# Patient Record
Sex: MALE | Race: WHITE | Employment: FULL TIME | URBAN - METROPOLITAN AREA
[De-identification: names, ages, dates, MRNs, and addresses within clinical notes are randomized per-mention and may not be internally consistent; named-entity substitution may affect disease eponyms.]

---

## 2017-02-24 ENCOUNTER — OFFICE VISIT (OUTPATIENT)
Dept: INTERNAL MEDICINE CLINIC | Age: 62
End: 2017-02-24

## 2017-02-24 VITALS
SYSTOLIC BLOOD PRESSURE: 128 MMHG | DIASTOLIC BLOOD PRESSURE: 80 MMHG | WEIGHT: 277 LBS | HEIGHT: 76 IN | BODY MASS INDEX: 33.73 KG/M2 | HEART RATE: 72 BPM

## 2017-02-24 DIAGNOSIS — N52.8 OTHER MALE ERECTILE DYSFUNCTION: ICD-10-CM

## 2017-02-24 DIAGNOSIS — K21.9 GASTROESOPHAGEAL REFLUX DISEASE WITHOUT ESOPHAGITIS: Primary | ICD-10-CM

## 2017-02-24 DIAGNOSIS — G47.00 INSOMNIA, UNSPECIFIED TYPE: ICD-10-CM

## 2017-02-24 PROCEDURE — 99213 OFFICE O/P EST LOW 20 MIN: CPT | Performed by: INTERNAL MEDICINE

## 2017-02-24 RX ORDER — ZOLPIDEM TARTRATE 10 MG/1
TABLET ORAL
Qty: 90 TABLET | Refills: 0 | Status: SHIPPED | OUTPATIENT
Start: 2017-02-24 | End: 2017-06-05 | Stop reason: SDUPTHER

## 2017-02-24 RX ORDER — OMEPRAZOLE 20 MG/1
CAPSULE, DELAYED RELEASE ORAL
Qty: 90 CAPSULE | Refills: 1 | Status: SHIPPED | OUTPATIENT
Start: 2017-02-24 | End: 2017-08-25 | Stop reason: SDUPTHER

## 2017-02-24 RX ORDER — TADALAFIL 5 MG/1
TABLET ORAL
Qty: 30 TABLET | Refills: 5 | Status: SHIPPED | OUTPATIENT
Start: 2017-02-24 | End: 2017-07-07 | Stop reason: SDUPTHER

## 2017-02-24 ASSESSMENT — ENCOUNTER SYMPTOMS
BLOOD IN STOOL: 0
COUGH: 0
WHEEZING: 0
SHORTNESS OF BREATH: 0
NAUSEA: 0
VOMITING: 0
DIARRHEA: 0
CHEST TIGHTNESS: 0
ABDOMINAL PAIN: 0

## 2017-05-30 RX ORDER — SILDENAFIL CITRATE 20 MG/1
TABLET ORAL
Qty: 30 TABLET | Refills: 2 | Status: SHIPPED | OUTPATIENT
Start: 2017-05-30 | End: 2017-07-07 | Stop reason: SDUPTHER

## 2017-06-05 RX ORDER — ZOLPIDEM TARTRATE 10 MG/1
TABLET ORAL
Qty: 90 TABLET | Refills: 0 | Status: SHIPPED | OUTPATIENT
Start: 2017-06-05 | End: 2017-09-05 | Stop reason: SDUPTHER

## 2017-07-07 RX ORDER — TADALAFIL 5 MG/1
TABLET ORAL
Qty: 30 TABLET | Refills: 2 | Status: SHIPPED | OUTPATIENT
Start: 2017-07-07 | End: 2017-07-13 | Stop reason: ALTCHOICE

## 2017-07-07 RX ORDER — SILDENAFIL CITRATE 20 MG/1
TABLET ORAL
Qty: 30 TABLET | Refills: 2 | Status: SHIPPED | OUTPATIENT
Start: 2017-07-07 | End: 2017-07-07 | Stop reason: ALTCHOICE

## 2017-07-13 ENCOUNTER — OFFICE VISIT (OUTPATIENT)
Dept: INTERNAL MEDICINE CLINIC | Age: 62
End: 2017-07-13

## 2017-07-13 VITALS
SYSTOLIC BLOOD PRESSURE: 142 MMHG | BODY MASS INDEX: 34.46 KG/M2 | DIASTOLIC BLOOD PRESSURE: 85 MMHG | HEART RATE: 60 BPM | HEIGHT: 76 IN | WEIGHT: 283 LBS

## 2017-07-13 DIAGNOSIS — M79.674 GREAT TOE PAIN, RIGHT: ICD-10-CM

## 2017-07-13 DIAGNOSIS — M79.674 GREAT TOE PAIN, RIGHT: Primary | ICD-10-CM

## 2017-07-13 PROCEDURE — 99213 OFFICE O/P EST LOW 20 MIN: CPT | Performed by: PHYSICIAN ASSISTANT

## 2017-07-13 RX ORDER — ONDANSETRON 4 MG/1
TABLET, FILM COATED ORAL
Qty: 20 TABLET | Refills: 0 | Status: SHIPPED | OUTPATIENT
Start: 2017-07-13 | End: 2018-02-26 | Stop reason: SDUPTHER

## 2017-07-13 RX ORDER — SILDENAFIL CITRATE 20 MG/1
TABLET ORAL
Qty: 30 TABLET | Refills: 2 | Status: SHIPPED | OUTPATIENT
Start: 2017-07-13 | End: 2018-02-26 | Stop reason: SDUPTHER

## 2017-07-13 ASSESSMENT — ENCOUNTER SYMPTOMS
VOMITING: 0
COLOR CHANGE: 1
NAUSEA: 0

## 2017-07-14 LAB — URIC ACID, SERUM: 7.2 MG/DL (ref 3.5–7.2)

## 2017-07-19 ENCOUNTER — OFFICE VISIT (OUTPATIENT)
Dept: INTERNAL MEDICINE CLINIC | Age: 62
End: 2017-07-19

## 2017-07-19 ENCOUNTER — HOSPITAL ENCOUNTER (OUTPATIENT)
Dept: OTHER | Age: 62
Discharge: HOME | End: 2017-07-19
Attending: PHYSICIAN ASSISTANT | Admitting: PHYSICIAN ASSISTANT

## 2017-07-19 VITALS — WEIGHT: 283 LBS | HEIGHT: 76 IN | BODY MASS INDEX: 34.46 KG/M2

## 2017-07-19 DIAGNOSIS — M79.674 GREAT TOE PAIN, RIGHT: Primary | ICD-10-CM

## 2017-07-19 DIAGNOSIS — R13.14 PHARYNGOESOPHAGEAL DYSPHAGIA: ICD-10-CM

## 2017-07-19 DIAGNOSIS — M79.674 GREAT TOE PAIN, RIGHT: ICD-10-CM

## 2017-07-19 PROCEDURE — 99212 OFFICE O/P EST SF 10 MIN: CPT | Performed by: PHYSICIAN ASSISTANT

## 2017-07-19 ASSESSMENT — ENCOUNTER SYMPTOMS
COLOR CHANGE: 1
VOMITING: 0
NAUSEA: 0

## 2017-07-27 ENCOUNTER — HOSPITAL ENCOUNTER (OUTPATIENT)
Dept: OTHER | Age: 62
Discharge: HOME | End: 2017-07-27
Attending: INTERNAL MEDICINE | Admitting: INTERNAL MEDICINE

## 2017-07-27 VITALS
RESPIRATION RATE: 16 BRPM | BODY MASS INDEX: 34.1 KG/M2 | DIASTOLIC BLOOD PRESSURE: 78 MMHG | SYSTOLIC BLOOD PRESSURE: 135 MMHG | OXYGEN SATURATION: 98 % | HEIGHT: 76 IN | HEART RATE: 60 BPM | TEMPERATURE: 97.5 F | WEIGHT: 280 LBS

## 2017-07-27 RX ORDER — SODIUM CHLORIDE, SODIUM LACTATE, POTASSIUM CHLORIDE, CALCIUM CHLORIDE 600; 310; 30; 20 MG/100ML; MG/100ML; MG/100ML; MG/100ML
INJECTION, SOLUTION INTRAVENOUS CONTINUOUS
Status: DISCONTINUED | OUTPATIENT
Start: 2017-07-27 | End: 2017-07-28 | Stop reason: HOSPADM

## 2017-07-27 RX ADMIN — SODIUM CHLORIDE, SODIUM LACTATE, POTASSIUM CHLORIDE, CALCIUM CHLORIDE: 600; 310; 30; 20 INJECTION, SOLUTION INTRAVENOUS at 08:35

## 2017-07-27 ASSESSMENT — PAIN - FUNCTIONAL ASSESSMENT: PAIN_FUNCTIONAL_ASSESSMENT: 0-10

## 2017-08-25 ENCOUNTER — OFFICE VISIT (OUTPATIENT)
Dept: INTERNAL MEDICINE CLINIC | Age: 62
End: 2017-08-25

## 2017-08-25 VITALS
HEIGHT: 76 IN | HEART RATE: 72 BPM | BODY MASS INDEX: 33.61 KG/M2 | SYSTOLIC BLOOD PRESSURE: 130 MMHG | WEIGHT: 276 LBS | DIASTOLIC BLOOD PRESSURE: 80 MMHG

## 2017-08-25 DIAGNOSIS — R11.0 NAUSEA: ICD-10-CM

## 2017-08-25 DIAGNOSIS — K21.9 GASTROESOPHAGEAL REFLUX DISEASE WITHOUT ESOPHAGITIS: Primary | ICD-10-CM

## 2017-08-25 DIAGNOSIS — M15.9 PRIMARY OSTEOARTHRITIS INVOLVING MULTIPLE JOINTS: ICD-10-CM

## 2017-08-25 DIAGNOSIS — G47.00 INSOMNIA, UNSPECIFIED TYPE: ICD-10-CM

## 2017-08-25 PROCEDURE — 99214 OFFICE O/P EST MOD 30 MIN: CPT | Performed by: INTERNAL MEDICINE

## 2017-08-25 RX ORDER — OMEPRAZOLE 20 MG/1
CAPSULE, DELAYED RELEASE ORAL
Qty: 180 CAPSULE | Refills: 0 | Status: SHIPPED | OUTPATIENT
Start: 2017-08-25 | End: 2018-02-26 | Stop reason: SDUPTHER

## 2017-08-25 RX ORDER — TRAMADOL HYDROCHLORIDE 50 MG/1
50 TABLET ORAL EVERY 12 HOURS PRN
Qty: 60 TABLET | Refills: 1 | Status: SHIPPED | OUTPATIENT
Start: 2017-08-25 | End: 2017-12-20 | Stop reason: SDUPTHER

## 2017-08-25 ASSESSMENT — ENCOUNTER SYMPTOMS
DIARRHEA: 0
COUGH: 0
SHORTNESS OF BREATH: 0
BLOOD IN STOOL: 0
CHEST TIGHTNESS: 0
ABDOMINAL PAIN: 0
NAUSEA: 1
VOMITING: 1
WHEEZING: 0

## 2017-09-06 RX ORDER — ZOLPIDEM TARTRATE 10 MG/1
TABLET ORAL
Qty: 90 TABLET | Refills: 0 | Status: SHIPPED | OUTPATIENT
Start: 2017-09-06 | End: 2017-12-18 | Stop reason: SDUPTHER

## 2017-12-20 RX ORDER — ZOLPIDEM TARTRATE 10 MG/1
TABLET ORAL
Qty: 90 TABLET | Refills: 0 | Status: SHIPPED | OUTPATIENT
Start: 2017-12-20 | End: 2018-03-29 | Stop reason: SDUPTHER

## 2017-12-20 RX ORDER — TRAMADOL HYDROCHLORIDE 50 MG/1
TABLET ORAL
Qty: 60 TABLET | Refills: 1 | Status: SHIPPED | OUTPATIENT
Start: 2017-12-20 | End: 2021-12-25

## 2018-02-26 ENCOUNTER — OFFICE VISIT (OUTPATIENT)
Dept: INTERNAL MEDICINE CLINIC | Age: 63
End: 2018-02-26

## 2018-02-26 VITALS
BODY MASS INDEX: 34.58 KG/M2 | DIASTOLIC BLOOD PRESSURE: 80 MMHG | HEART RATE: 72 BPM | HEIGHT: 76 IN | WEIGHT: 284 LBS | SYSTOLIC BLOOD PRESSURE: 128 MMHG

## 2018-02-26 DIAGNOSIS — K21.9 GASTROESOPHAGEAL REFLUX DISEASE WITHOUT ESOPHAGITIS: Primary | ICD-10-CM

## 2018-02-26 DIAGNOSIS — R11.2 NON-INTRACTABLE VOMITING WITH NAUSEA, UNSPECIFIED VOMITING TYPE: ICD-10-CM

## 2018-02-26 DIAGNOSIS — N52.8 OTHER MALE ERECTILE DYSFUNCTION: ICD-10-CM

## 2018-02-26 DIAGNOSIS — M15.9 PRIMARY OSTEOARTHRITIS INVOLVING MULTIPLE JOINTS: ICD-10-CM

## 2018-02-26 DIAGNOSIS — G47.00 INSOMNIA, UNSPECIFIED TYPE: ICD-10-CM

## 2018-02-26 LAB
BASOPHILS ABSOLUTE: 0 K/UL (ref 0–0.2)
BASOPHILS RELATIVE PERCENT: 0.4 %
EOSINOPHILS ABSOLUTE: 0.1 K/UL (ref 0–0.6)
EOSINOPHILS RELATIVE PERCENT: 1.6 %
HCT VFR BLD CALC: 43.1 % (ref 40.5–52.5)
HEMOGLOBIN: 14.8 G/DL (ref 13.5–17.5)
LYMPHOCYTES ABSOLUTE: 1.8 K/UL (ref 1–5.1)
LYMPHOCYTES RELATIVE PERCENT: 37 %
MCH RBC QN AUTO: 30.7 PG (ref 26–34)
MCHC RBC AUTO-ENTMCNC: 34.3 G/DL (ref 31–36)
MCV RBC AUTO: 89.5 FL (ref 80–100)
MONOCYTES ABSOLUTE: 0.3 K/UL (ref 0–1.3)
MONOCYTES RELATIVE PERCENT: 6 %
NEUTROPHILS ABSOLUTE: 2.6 K/UL (ref 1.7–7.7)
NEUTROPHILS RELATIVE PERCENT: 55 %
PDW BLD-RTO: 12.9 % (ref 12.4–15.4)
PLATELET # BLD: 180 K/UL (ref 135–450)
PMV BLD AUTO: 9.5 FL (ref 5–10.5)
RBC # BLD: 4.81 M/UL (ref 4.2–5.9)
WBC # BLD: 4.7 K/UL (ref 4–11)

## 2018-02-26 PROCEDURE — 99214 OFFICE O/P EST MOD 30 MIN: CPT | Performed by: INTERNAL MEDICINE

## 2018-02-26 RX ORDER — SILDENAFIL CITRATE 20 MG/1
TABLET ORAL
Qty: 30 TABLET | Refills: 2 | Status: SHIPPED | OUTPATIENT
Start: 2018-02-26 | End: 2021-12-25

## 2018-02-26 RX ORDER — OMEPRAZOLE 20 MG/1
CAPSULE, DELAYED RELEASE ORAL
Qty: 180 CAPSULE | Refills: 0 | Status: SHIPPED | OUTPATIENT
Start: 2018-02-26

## 2018-02-26 RX ORDER — ONDANSETRON 4 MG/1
TABLET, FILM COATED ORAL
Qty: 20 TABLET | Refills: 0 | Status: SHIPPED | OUTPATIENT
Start: 2018-02-26 | End: 2021-12-25

## 2018-02-26 ASSESSMENT — ENCOUNTER SYMPTOMS
WHEEZING: 0
BLOOD IN STOOL: 0
SHORTNESS OF BREATH: 0
ABDOMINAL PAIN: 0
NAUSEA: 0
DIARRHEA: 0
COUGH: 0
CHEST TIGHTNESS: 0
VOMITING: 1

## 2018-02-26 NOTE — PROGRESS NOTES
Subjective:      Patient ID: Nevaeh Anne is a 63 y.o. male. HPI  Is here for follow up of PUD,HTN,diet controlled DM,insomnia,GERD and erectile dysfunction. BP and sugars have been good ever since his weight loss from gastric bypass surgery. Insomnia is stable. GERD. He is currently on PPI. GERD symptoms are worse at night when he lays in bed. Continues to have nausea periodically. Has reflux symptoms. Saw GI.  EGD- mild anastamotic stricture. Dilated. He claims to be vomiting on a regular basis. No weight loss    Review of Systems   Constitutional: Negative. Negative for fatigue and fever. Respiratory: Negative for cough, chest tightness, shortness of breath and wheezing. Cardiovascular: Negative for chest pain and palpitations. Gastrointestinal: Positive for vomiting. Negative for abdominal pain, blood in stool, diarrhea and nausea. Genitourinary: Negative for dysuria and hematuria. Objective:   Physical Exam   Constitutional: He is oriented to person, place, and time. He appears well-developed and well-nourished. HENT:   Head: Normocephalic and atraumatic. Eyes: Pupils are equal, round, and reactive to light. Neck: Normal range of motion. Neck supple. No thyromegaly present. Cardiovascular: Normal rate, regular rhythm and normal heart sounds. Exam reveals no gallop and no friction rub. No murmur heard. No carotid bruit   Pulmonary/Chest: Effort normal and breath sounds normal. No respiratory distress. He has no wheezes. He has no rales. He exhibits no tenderness. Abdominal: Soft. Bowel sounds are normal. He exhibits no distension and no mass. There is no tenderness. There is no rebound and no guarding. Musculoskeletal: He exhibits no edema. Neurological: He is alert and oriented to person, place, and time. Assessment:      1. Gastroesophageal reflux disease without esophagitis     2. Insomnia, unspecified type     3. Other male erectile dysfunction     4.

## 2018-02-27 LAB
A/G RATIO: 1.9 (ref 1.1–2.2)
ALBUMIN SERPL-MCNC: 4.4 G/DL (ref 3.4–5)
ALP BLD-CCNC: 101 U/L (ref 40–129)
ALT SERPL-CCNC: 9 U/L (ref 10–40)
ANION GAP SERPL CALCULATED.3IONS-SCNC: 13 MMOL/L (ref 3–16)
AST SERPL-CCNC: 15 U/L (ref 15–37)
BILIRUB SERPL-MCNC: 0.7 MG/DL (ref 0–1)
BUN BLDV-MCNC: 10 MG/DL (ref 7–20)
CALCIUM SERPL-MCNC: 8.5 MG/DL (ref 8.3–10.6)
CHLORIDE BLD-SCNC: 105 MMOL/L (ref 99–110)
CO2: 24 MMOL/L (ref 21–32)
CREAT SERPL-MCNC: 1 MG/DL (ref 0.8–1.3)
GFR AFRICAN AMERICAN: >60
GFR NON-AFRICAN AMERICAN: >60
GLOBULIN: 2.3 G/DL
GLUCOSE BLD-MCNC: 112 MG/DL (ref 70–99)
POTASSIUM SERPL-SCNC: 4.4 MMOL/L (ref 3.5–5.1)
SODIUM BLD-SCNC: 142 MMOL/L (ref 136–145)
TOTAL PROTEIN: 6.7 G/DL (ref 6.4–8.2)

## 2018-03-29 DIAGNOSIS — G47.00 INSOMNIA, UNSPECIFIED TYPE: Primary | ICD-10-CM

## 2018-03-29 RX ORDER — ZOLPIDEM TARTRATE 10 MG/1
TABLET ORAL
Qty: 90 TABLET | Refills: 0 | Status: SHIPPED | OUTPATIENT
Start: 2018-03-29 | End: 2021-12-25

## 2021-12-25 ENCOUNTER — HOSPITAL ENCOUNTER (EMERGENCY)
Age: 66
Discharge: ANOTHER HOSPITAL | End: 2021-12-26
Attending: EMERGENCY MEDICINE
Payer: OTHER GOVERNMENT

## 2021-12-25 ENCOUNTER — APPOINTMENT (OUTPATIENT)
Dept: GENERAL RADIOLOGY | Age: 66
End: 2021-12-25
Payer: OTHER GOVERNMENT

## 2021-12-25 DIAGNOSIS — R00.1 SYMPTOMATIC BRADYCARDIA: Primary | ICD-10-CM

## 2021-12-25 LAB
A/G RATIO: 1.8 (ref 1.1–2.2)
ALBUMIN SERPL-MCNC: 4.1 G/DL (ref 3.4–5)
ALP BLD-CCNC: 122 U/L (ref 40–129)
ALT SERPL-CCNC: 33 U/L (ref 10–40)
ANION GAP SERPL CALCULATED.3IONS-SCNC: 9 MMOL/L (ref 3–16)
AST SERPL-CCNC: 25 U/L (ref 15–37)
BASOPHILS ABSOLUTE: 0 K/UL (ref 0–0.2)
BASOPHILS RELATIVE PERCENT: 0.7 %
BILIRUB SERPL-MCNC: 0.4 MG/DL (ref 0–1)
BUN BLDV-MCNC: 12 MG/DL (ref 7–20)
CALCIUM SERPL-MCNC: 8.5 MG/DL (ref 8.3–10.6)
CHLORIDE BLD-SCNC: 101 MMOL/L (ref 99–110)
CO2: 24 MMOL/L (ref 21–32)
CREAT SERPL-MCNC: 1.1 MG/DL (ref 0.8–1.3)
EOSINOPHILS ABSOLUTE: 0.2 K/UL (ref 0–0.6)
EOSINOPHILS RELATIVE PERCENT: 4 %
GFR AFRICAN AMERICAN: >60
GFR NON-AFRICAN AMERICAN: >60
GLUCOSE BLD-MCNC: 115 MG/DL (ref 70–99)
HCT VFR BLD CALC: 41.4 % (ref 40.5–52.5)
HEMOGLOBIN: 14.4 G/DL (ref 13.5–17.5)
LYMPHOCYTES ABSOLUTE: 1.7 K/UL (ref 1–5.1)
LYMPHOCYTES RELATIVE PERCENT: 35.1 %
MCH RBC QN AUTO: 32.1 PG (ref 26–34)
MCHC RBC AUTO-ENTMCNC: 34.8 G/DL (ref 31–36)
MCV RBC AUTO: 92.3 FL (ref 80–100)
MONOCYTES ABSOLUTE: 0.3 K/UL (ref 0–1.3)
MONOCYTES RELATIVE PERCENT: 6.5 %
NEUTROPHILS ABSOLUTE: 2.6 K/UL (ref 1.7–7.7)
NEUTROPHILS RELATIVE PERCENT: 53.7 %
PDW BLD-RTO: 12.9 % (ref 12.4–15.4)
PLATELET # BLD: 160 K/UL (ref 135–450)
PMV BLD AUTO: 7.3 FL (ref 5–10.5)
POTASSIUM REFLEX MAGNESIUM: 4.3 MMOL/L (ref 3.5–5.1)
PRO-BNP: 243 PG/ML (ref 0–124)
RBC # BLD: 4.48 M/UL (ref 4.2–5.9)
SODIUM BLD-SCNC: 134 MMOL/L (ref 136–145)
TOTAL PROTEIN: 6.4 G/DL (ref 6.4–8.2)
TROPONIN: <0.01 NG/ML
TSH REFLEX: 1.14 UIU/ML (ref 0.27–4.2)
WBC # BLD: 4.8 K/UL (ref 4–11)

## 2021-12-25 PROCEDURE — 85025 COMPLETE CBC W/AUTO DIFF WBC: CPT

## 2021-12-25 PROCEDURE — 84484 ASSAY OF TROPONIN QUANT: CPT

## 2021-12-25 PROCEDURE — 80053 COMPREHEN METABOLIC PANEL: CPT

## 2021-12-25 PROCEDURE — 71045 X-RAY EXAM CHEST 1 VIEW: CPT

## 2021-12-25 PROCEDURE — 99283 EMERGENCY DEPT VISIT LOW MDM: CPT

## 2021-12-25 PROCEDURE — 84443 ASSAY THYROID STIM HORMONE: CPT

## 2021-12-25 PROCEDURE — 93005 ELECTROCARDIOGRAM TRACING: CPT | Performed by: EMERGENCY MEDICINE

## 2021-12-25 PROCEDURE — 83880 ASSAY OF NATRIURETIC PEPTIDE: CPT

## 2021-12-25 RX ORDER — MELOXICAM 15 MG/1
15 TABLET ORAL DAILY
COMMUNITY

## 2021-12-25 RX ORDER — ASPIRIN 81 MG/1
81 TABLET ORAL DAILY
COMMUNITY

## 2021-12-25 RX ORDER — LANOLIN ALCOHOL/MO/W.PET/CERES
1000 CREAM (GRAM) TOPICAL DAILY
COMMUNITY

## 2021-12-25 RX ORDER — PRIMIDONE 250 MG/1
300 TABLET ORAL NIGHTLY
COMMUNITY

## 2021-12-25 RX ORDER — AMITRIPTYLINE HYDROCHLORIDE 10 MG/1
20 TABLET, FILM COATED ORAL NIGHTLY
COMMUNITY

## 2021-12-25 RX ORDER — OXCARBAZEPINE 600 MG/1
600 TABLET, FILM COATED ORAL 2 TIMES DAILY
COMMUNITY

## 2021-12-25 RX ORDER — TAMSULOSIN HYDROCHLORIDE 0.4 MG/1
0.4 CAPSULE ORAL DAILY
COMMUNITY

## 2021-12-25 RX ORDER — METOPROLOL SUCCINATE 100 MG/1
100 TABLET, EXTENDED RELEASE ORAL DAILY
Status: ON HOLD | COMMUNITY
End: 2021-12-26

## 2021-12-25 RX ORDER — BUPROPION HYDROCHLORIDE 150 MG/1
150 TABLET, EXTENDED RELEASE ORAL 2 TIMES DAILY
COMMUNITY

## 2021-12-25 NOTE — ED NOTES
Bed: 06  Expected date:   Expected time:   Means of arrival:   Comments:  CHRISTEL Baez  12/25/21 4832

## 2021-12-26 ENCOUNTER — HOSPITAL ENCOUNTER (INPATIENT)
Age: 66
LOS: 1 days | Discharge: HOME | DRG: 310 | End: 2021-12-27
Attending: INTERNAL MEDICINE | Admitting: INTERNAL MEDICINE
Payer: OTHER GOVERNMENT

## 2021-12-26 VITALS
TEMPERATURE: 97.4 F | HEIGHT: 76 IN | OXYGEN SATURATION: 96 % | RESPIRATION RATE: 16 BRPM | BODY MASS INDEX: 36.53 KG/M2 | HEART RATE: 40 BPM | WEIGHT: 300 LBS | DIASTOLIC BLOOD PRESSURE: 86 MMHG | SYSTOLIC BLOOD PRESSURE: 129 MMHG

## 2021-12-26 DIAGNOSIS — R00.1 SYMPTOMATIC BRADYCARDIA: Primary | ICD-10-CM

## 2021-12-26 LAB
ALBUMIN SERPL-MCNC: 4.3 G/DL (ref 3.4–5)
ANION GAP SERPL CALCULATED.3IONS-SCNC: 10 MMOL/L (ref 3–16)
APTT: 33.6 SEC (ref 26.2–38.6)
BASOPHILS ABSOLUTE: 0 K/UL (ref 0–0.2)
BASOPHILS RELATIVE PERCENT: 0.4 %
BUN BLDV-MCNC: 10 MG/DL (ref 7–20)
CALCIUM SERPL-MCNC: 8.4 MG/DL (ref 8.3–10.6)
CHLORIDE BLD-SCNC: 103 MMOL/L (ref 99–110)
CO2: 24 MMOL/L (ref 21–32)
CREAT SERPL-MCNC: 0.9 MG/DL (ref 0.8–1.3)
EKG ATRIAL RATE: 43 BPM
EKG DIAGNOSIS: NORMAL
EKG P AXIS: 26 DEGREES
EKG P-R INTERVAL: 170 MS
EKG Q-T INTERVAL: 438 MS
EKG QRS DURATION: 92 MS
EKG QTC CALCULATION (BAZETT): 370 MS
EKG R AXIS: 58 DEGREES
EKG T AXIS: 27 DEGREES
EKG VENTRICULAR RATE: 43 BPM
EOSINOPHILS ABSOLUTE: 0.1 K/UL (ref 0–0.6)
EOSINOPHILS RELATIVE PERCENT: 3 %
GFR AFRICAN AMERICAN: >60
GFR NON-AFRICAN AMERICAN: >60
GLUCOSE BLD-MCNC: 113 MG/DL (ref 70–99)
HCT VFR BLD CALC: 44.2 % (ref 40.5–52.5)
HEMOGLOBIN: 15.2 G/DL (ref 13.5–17.5)
INR BLD: 1.07 (ref 0.88–1.12)
LYMPHOCYTES ABSOLUTE: 1.4 K/UL (ref 1–5.1)
LYMPHOCYTES RELATIVE PERCENT: 28.5 %
MAGNESIUM: 2 MG/DL (ref 1.8–2.4)
MCH RBC QN AUTO: 32.4 PG (ref 26–34)
MCHC RBC AUTO-ENTMCNC: 34.5 G/DL (ref 31–36)
MCV RBC AUTO: 94.1 FL (ref 80–100)
MONOCYTES ABSOLUTE: 0.3 K/UL (ref 0–1.3)
MONOCYTES RELATIVE PERCENT: 5.7 %
NEUTROPHILS ABSOLUTE: 3.1 K/UL (ref 1.7–7.7)
NEUTROPHILS RELATIVE PERCENT: 62.4 %
PDW BLD-RTO: 12.9 % (ref 12.4–15.4)
PHOSPHORUS: 3.2 MG/DL (ref 2.5–4.9)
PLATELET # BLD: 148 K/UL (ref 135–450)
PMV BLD AUTO: 7.9 FL (ref 5–10.5)
POTASSIUM SERPL-SCNC: 4.2 MMOL/L (ref 3.5–5.1)
PROTHROMBIN TIME: 12.1 SEC (ref 9.9–12.7)
RBC # BLD: 4.7 M/UL (ref 4.2–5.9)
SODIUM BLD-SCNC: 137 MMOL/L (ref 136–145)
WBC # BLD: 4.9 K/UL (ref 4–11)

## 2021-12-26 PROCEDURE — 93010 ELECTROCARDIOGRAM REPORT: CPT | Performed by: INTERNAL MEDICINE

## 2021-12-26 PROCEDURE — 85025 COMPLETE CBC W/AUTO DIFF WBC: CPT

## 2021-12-26 PROCEDURE — 85730 THROMBOPLASTIN TIME PARTIAL: CPT

## 2021-12-26 PROCEDURE — 80069 RENAL FUNCTION PANEL: CPT

## 2021-12-26 PROCEDURE — 85610 PROTHROMBIN TIME: CPT

## 2021-12-26 PROCEDURE — 6360000002 HC RX W HCPCS: Performed by: STUDENT IN AN ORGANIZED HEALTH CARE EDUCATION/TRAINING PROGRAM

## 2021-12-26 PROCEDURE — 83735 ASSAY OF MAGNESIUM: CPT

## 2021-12-26 PROCEDURE — 2580000003 HC RX 258: Performed by: STUDENT IN AN ORGANIZED HEALTH CARE EDUCATION/TRAINING PROGRAM

## 2021-12-26 PROCEDURE — 6370000000 HC RX 637 (ALT 250 FOR IP): Performed by: STUDENT IN AN ORGANIZED HEALTH CARE EDUCATION/TRAINING PROGRAM

## 2021-12-26 PROCEDURE — 36415 COLL VENOUS BLD VENIPUNCTURE: CPT

## 2021-12-26 PROCEDURE — 2060000000 HC ICU INTERMEDIATE R&B

## 2021-12-26 RX ORDER — HEPARIN SODIUM 5000 [USP'U]/ML
5000 INJECTION, SOLUTION INTRAVENOUS; SUBCUTANEOUS EVERY 8 HOURS SCHEDULED
Status: DISCONTINUED | OUTPATIENT
Start: 2021-12-26 | End: 2021-12-27 | Stop reason: HOSPADM

## 2021-12-26 RX ORDER — ONDANSETRON 4 MG/1
4 TABLET, ORALLY DISINTEGRATING ORAL EVERY 8 HOURS PRN
Status: DISCONTINUED | OUTPATIENT
Start: 2021-12-26 | End: 2021-12-27 | Stop reason: HOSPADM

## 2021-12-26 RX ORDER — SODIUM CHLORIDE 9 MG/ML
25 INJECTION, SOLUTION INTRAVENOUS PRN
Status: DISCONTINUED | OUTPATIENT
Start: 2021-12-26 | End: 2021-12-27 | Stop reason: HOSPADM

## 2021-12-26 RX ORDER — OXCARBAZEPINE 300 MG/1
600 TABLET, FILM COATED ORAL 2 TIMES DAILY
Status: DISCONTINUED | OUTPATIENT
Start: 2021-12-26 | End: 2021-12-27 | Stop reason: HOSPADM

## 2021-12-26 RX ORDER — ASPIRIN 81 MG/1
81 TABLET ORAL DAILY
Status: DISCONTINUED | OUTPATIENT
Start: 2021-12-27 | End: 2021-12-27 | Stop reason: HOSPADM

## 2021-12-26 RX ORDER — SODIUM CHLORIDE 0.9 % (FLUSH) 0.9 %
5-40 SYRINGE (ML) INJECTION PRN
Status: DISCONTINUED | OUTPATIENT
Start: 2021-12-26 | End: 2021-12-27 | Stop reason: HOSPADM

## 2021-12-26 RX ORDER — ACETAMINOPHEN 650 MG/1
650 SUPPOSITORY RECTAL EVERY 6 HOURS PRN
Status: DISCONTINUED | OUTPATIENT
Start: 2021-12-26 | End: 2021-12-27 | Stop reason: HOSPADM

## 2021-12-26 RX ORDER — AMITRIPTYLINE HYDROCHLORIDE 10 MG/1
20 TABLET, FILM COATED ORAL NIGHTLY
Status: DISCONTINUED | OUTPATIENT
Start: 2021-12-26 | End: 2021-12-27 | Stop reason: HOSPADM

## 2021-12-26 RX ORDER — PANTOPRAZOLE SODIUM 40 MG/1
40 TABLET, DELAYED RELEASE ORAL DAILY PRN
Status: DISCONTINUED | OUTPATIENT
Start: 2021-12-26 | End: 2021-12-27 | Stop reason: HOSPADM

## 2021-12-26 RX ORDER — SODIUM CHLORIDE 0.9 % (FLUSH) 0.9 %
5-40 SYRINGE (ML) INJECTION EVERY 12 HOURS SCHEDULED
Status: DISCONTINUED | OUTPATIENT
Start: 2021-12-26 | End: 2021-12-27 | Stop reason: HOSPADM

## 2021-12-26 RX ORDER — ACETAMINOPHEN 325 MG/1
650 TABLET ORAL EVERY 6 HOURS PRN
Status: DISCONTINUED | OUTPATIENT
Start: 2021-12-26 | End: 2021-12-27 | Stop reason: HOSPADM

## 2021-12-26 RX ORDER — BUPROPION HYDROCHLORIDE 150 MG/1
150 TABLET, EXTENDED RELEASE ORAL 2 TIMES DAILY
Status: DISCONTINUED | OUTPATIENT
Start: 2021-12-26 | End: 2021-12-27 | Stop reason: HOSPADM

## 2021-12-26 RX ORDER — LANOLIN ALCOHOL/MO/W.PET/CERES
1000 CREAM (GRAM) TOPICAL DAILY
Status: DISCONTINUED | OUTPATIENT
Start: 2021-12-26 | End: 2021-12-27 | Stop reason: HOSPADM

## 2021-12-26 RX ORDER — ONDANSETRON 2 MG/ML
4 INJECTION INTRAMUSCULAR; INTRAVENOUS EVERY 6 HOURS PRN
Status: DISCONTINUED | OUTPATIENT
Start: 2021-12-26 | End: 2021-12-27 | Stop reason: HOSPADM

## 2021-12-26 RX ORDER — POLYETHYLENE GLYCOL 3350 17 G/17G
17 POWDER, FOR SOLUTION ORAL DAILY PRN
Status: DISCONTINUED | OUTPATIENT
Start: 2021-12-26 | End: 2021-12-27 | Stop reason: HOSPADM

## 2021-12-26 RX ADMIN — OXCARBAZEPINE 600 MG: 300 TABLET, FILM COATED ORAL at 14:03

## 2021-12-26 RX ADMIN — PRIMIDONE 300 MG: 50 TABLET ORAL at 21:35

## 2021-12-26 RX ADMIN — BUPROPION HYDROCHLORIDE 150 MG: 150 TABLET, EXTENDED RELEASE ORAL at 13:59

## 2021-12-26 RX ADMIN — HEPARIN SODIUM 5000 UNITS: 5000 INJECTION INTRAVENOUS; SUBCUTANEOUS at 21:35

## 2021-12-26 RX ADMIN — OXCARBAZEPINE 600 MG: 300 TABLET, FILM COATED ORAL at 21:35

## 2021-12-26 RX ADMIN — AMITRIPTYLINE HYDROCHLORIDE 20 MG: 10 TABLET, FILM COATED ORAL at 21:35

## 2021-12-26 RX ADMIN — SODIUM CHLORIDE, PRESERVATIVE FREE 10 ML: 5 INJECTION INTRAVENOUS at 21:40

## 2021-12-26 RX ADMIN — CYANOCOBALAMIN TAB 1000 MCG 1000 MCG: 1000 TAB at 13:59

## 2021-12-26 RX ADMIN — BUPROPION HYDROCHLORIDE 150 MG: 150 TABLET, EXTENDED RELEASE ORAL at 21:35

## 2021-12-26 RX ADMIN — HEPARIN SODIUM 5000 UNITS: 5000 INJECTION INTRAVENOUS; SUBCUTANEOUS at 13:59

## 2021-12-26 ASSESSMENT — PAIN SCALES - GENERAL
PAINLEVEL_OUTOF10: 0

## 2021-12-26 ASSESSMENT — ENCOUNTER SYMPTOMS
ABDOMINAL PAIN: 0
SHORTNESS OF BREATH: 0
DIARRHEA: 0
VOMITING: 0
NAUSEA: 0
SORE THROAT: 0
COUGH: 0
CHEST TIGHTNESS: 0
CONSTIPATION: 1
ALLERGIC/IMMUNOLOGIC NEGATIVE: 1

## 2021-12-26 NOTE — PROGRESS NOTES
4 Eyes Admission Assessment     I agree as the admission nurse that 2 RN's have performed a thorough Head to Toe Skin Assessment on the patient. ALL assessment sites listed below have been assessed on admission. Areas assessed by both nurses: \  [x]   Head, Face, and Ears   [x]   Shoulders, Back, and Chest  [x]   Arms, Elbows, and Hands   [x]   Coccyx, Sacrum, and Ischium:pt declined  [x]   Legs, Feet, and Heels        Does the Patient have Skin Breakdown?   No         Monster Prevention initiated:  No   Wound Care Orders initiated:  No      [facility] nurse consulted for Pressure Injury (Stage 3,4, Unstageable, DTI, NWPT, and Complex wounds) or Monster score 18 or lower:  No      Nurse 1 eSignature: Electronically signed by Soledad Lopez RN on 12/26/21 at 12:22 PM EST    **SHARE this note so that the co-signing nurse is able to place an eSignature**    Nurse 2 eSignature: Electronically signed by Benita Sánchez RN on 12/27/21 at 6:51 PM EST

## 2021-12-26 NOTE — H&P
Internal Medicine  History & Physical      CC dizzy doing laundry    History Obtained From:  patient    HISTORY OF PRESENT ILLNESS:    Luzma Aguirre is a 61 y.o., male with PMH of gastric bypass (2009), GERD, hypertension, obesity, who presented to the ED with severe dizziness when doing laundry. He said that his wife took his pulse at that time when he was in the 40s. Patient follows at the [facility] after his son's death in September he was admitted to the hospital with similar symptoms and was treated for A. tach and told to follow-up with her cardiologist in January. Since September he has not been symptomatic until yesterday. ROS negative for chest pain, shortness of breath, abdominal pain, N/V,.  ROS positive for palpitations.       Past Medical History:        Diagnosis Date    Difficult intubation     on one occasion when weighed 405#    Erectile dysfunction     GERD (gastroesophageal reflux disease)     HNP (herniated nucleus pulposus), cervical     recent diagnosis    Hypertension     history of ; not at this time    Obesity     Pain     Cervical spine; intermittant; r/t HNP       Past Surgical History:        Procedure Laterality Date    CHOLECYSTECTOMY  2010    COLONOSCOPY  2/20/15    Normal    ENDOSCOPY, COLON, DIAGNOSTIC      with dilitation x 3    EYE SURGERY      Right r/t torn retina    GASTRIC BYPASS SURGERY      KNEE ARTHROSCOPY      Right    SHOULDER SURGERY      Left shoulder; bone spurs       Medications Prior to Admission:    Medications Prior to Admission: primidone (MYSOLINE) 250 MG tablet, Take 300 mg by mouth nightly  meloxicam (MOBIC) 15 MG tablet, Take 15 mg by mouth daily  amitriptyline (ELAVIL) 10 MG tablet, Take 10 mg by mouth nightly  aspirin 81 MG EC tablet, Take 81 mg by mouth daily  buPROPion (WELLBUTRIN SR) 150 MG extended release tablet, Take 150 mg by mouth 2 times daily  doxyLAMINE succinate (GNP SLEEP AID) 25 MG tablet, Take 25 mg by mouth 2 times daily OXcarbazepine (TRILEPTAL) 600 MG tablet, Take 600 mg by mouth 2 times daily  tamsulosin (FLOMAX) 0.4 MG capsule, Take 0.4 mg by mouth daily  vitamin B-12 (CYANOCOBALAMIN) 1000 MCG tablet, Take 1,000 mcg by mouth daily  omeprazole (PRILOSEC) 20 MG delayed release capsule, TAKE ONE CAPSULE BY MOUTH twice daily  [DISCONTINUED] metoprolol succinate (TOPROL XL) 100 MG extended release tablet, Take 100 mg by mouth daily    Allergies:  Tape Dutaviae Torstens tape]    Social History:   TOBACCO:   reports that he has never smoked. He has never used smokeless tobacco.  ETOH:   reports no history of alcohol use. Patient currently lives with family    Family History:       Problem Relation Age of Onset    Cancer Mother     Cancer Father     Cancer Sister     Heart Disease Brother     Heart Disease Brother        Review of Systems   Constitutional: Negative for chills and fever. HENT: Negative for sore throat. Eyes: Negative for visual disturbance. Respiratory: Negative for cough, chest tightness and shortness of breath. Cardiovascular: Positive for palpitations. Negative for chest pain and leg swelling. Gastrointestinal: Positive for constipation. Negative for abdominal pain, diarrhea, nausea and vomiting. Endocrine: Negative. Genitourinary: Negative for difficulty urinating and dysuria. Musculoskeletal: Negative. Skin: Negative. Allergic/Immunologic: Negative. Neurological: Positive for dizziness and tremors. Negative for syncope, speech difficulty, weakness and headaches. Hematological: Negative. Physical Exam  Constitutional:       General: He is not in acute distress. Appearance: Normal appearance. HENT:      Head: Normocephalic and atraumatic. Right Ear: External ear normal.      Left Ear: External ear normal.      Nose: Nose normal.      Mouth/Throat:      Pharynx: Oropharynx is clear. Eyes:      General: No scleral icterus.   Cardiovascular:      Rate and Rhythm: Regular rhythm. Bradycardia present. Pulses: Normal pulses. Heart sounds: No murmur heard. Comments: Sinus bradycardia down to 43 in room, increase to 60-70s with stimulation/sitting up  Pulmonary:      Effort: Pulmonary effort is normal. No respiratory distress. Breath sounds: Normal breath sounds. Abdominal:      General: Abdomen is flat. Bowel sounds are normal.      Palpations: Abdomen is soft. Tenderness: There is no abdominal tenderness. Musculoskeletal:      Cervical back: Normal range of motion. Right lower leg: No edema. Left lower leg: No edema. Skin:     General: Skin is warm and dry. Neurological:      General: No focal deficit present. Mental Status: He is alert and oriented to person, place, and time. Cranial Nerves: No cranial nerve deficit. Psychiatric:         Mood and Affect: Affect is flat. DATA:    Labs:  CBC:   Recent Labs     12/25/21 1949   WBC 4.8   HGB 14.4   HCT 41.4          BMP:   Recent Labs     12/25/21 1949   *   K 4.3      CO2 24   BUN 12   CREATININE 1.1   GLUCOSE 115*     LFT's:   Recent Labs     12/25/21 1949   AST 25   ALT 33   BILITOT 0.4   ALKPHOS 122     Troponin:   Recent Labs     12/25/21 1949   TROPONINI <0.01     BNP: No results for input(s): BNP in the last 72 hours. ABGs: No results for input(s): PHART, HRZ2WMK, PO2ART in the last 72 hours. INR: No results for input(s): INR in the last 72 hours. U/A:No results for input(s): NITRITE, COLORU, PHUR, LABCAST, WBCUA, RBCUA, MUCUS, TRICHOMONAS, YEAST, BACTERIA, CLARITYU, SPECGRAV, LEUKOCYTESUR, UROBILINOGEN, BILIRUBINUR, BLOODU, GLUCOSEU, AMORPHOUS in the last 72 hours.     Invalid input(s): Mary Keith    No orders to display           ASSESSMENT AND PLAN:    #symptomatic sinus bradycardia  In setting of afib/a-tach history  Pt hospitalized for lightheadedness 2/2 atrial tachycardia at [facility] [address] [facility] in September 2021  - holding metoprolol  - continuous tele  - cards consult    #GERD  - Protonix PRN  - no acute concerns at this time    #hx gastric bypass 2009  Takes B12, Fe, multivitamin  - monitor    #obesity  Morbid Obesity - BMI 34.57. Complicating assessment and treatment. Placing patient at risk for multiple co-morbidities as well as early death and contributing to the patient's presentation. - counseled on weight loss    #depression, PTSD  - continue home Wellbutrin  - never hospitalized for psych issues    #constipation  Will monitor, see if resolves after diet    Code Status: full  FEN: ADULT DIET;  Regular   PPX: heparin  DISPO: pending    Wilbur Holguin MD  12/26/2021,  1:02 PM    This patient will be staffed and discussed with Dr. Martínez Walker MD.

## 2021-12-26 NOTE — ED NOTES
Called the access center at 2041 to have the patient transferred to the [place], but the [place] is a capacity. The access center then tried [address] [place] [address]. Dr. Sydni Campbell called back at 2104 and accepted the patient to [address] [place] [address]. Waiting on a bed assignment.       Christie Caba  15/78/86 2114

## 2021-12-26 NOTE — ED PROVIDER NOTES
I was not involved in the care of this patient nor had any participation in the medical decision making process. I was the attending on duty while the patient was on hold in the ER. .  The patient was discharged or admitted from the ER without my knowledge. I was available for consultation but was not requested to evaluate the patient. I did not see the patient and I am signing this chart administratively after the fact.         Iam Bell MD  12/26/21 3230

## 2021-12-26 NOTE — ED NOTES
TIE#[id number] Bed 1 and report# [id number]. Waiting on transport information.       Angeline Rodriguez  75/03/23 5280

## 2021-12-26 NOTE — PLAN OF CARE
Shift  Note: SB/P stable, see flow sheet for ortho.  B/p's, 10mm change with standing     Problem: Cardiac:  Goal: Risk factors for ineffective tissue perfusion will decrease  Description: Risk factors for ineffective tissue perfusion will decrease  Outcome: Met This Shift     Problem: Fluid Volume:  Goal: Will show no signs or symptoms of fluid imbalance  Description: Will show no signs or symptoms of fluid imbalance  Outcome: Met This Shift  Note: See I/O wts neg ped/leg edema

## 2021-12-26 NOTE — ED PROVIDER NOTES
[address] ED      CHIEF COMPLAINT  Dizziness (Patient was carrying laundry earlier and got weak and dizzy and thought he may pass out)       [address] [facility] Broderick Carranza is a 61 y.o. male  who presents to the ED complaining of lightheadedness. The patient states that he was carrying laundry earlier today, when he all of a sudden felt very weak, lightheaded, and felt like he was going to pass out. His wife had him lay down, she checked his pulse and it was very low in the 30s. At that point she brought him here. He states this is happened once before, he also wore a monitor in the past and he thinks it may have showed atrial fibrillation he follows with the [place]. He denies syncope today. He describes an \"odd\" sensation and fluttery sensation in his upper chest but denies chest pain or pressure. He denies shortness of breath. He feels as though he could not walk due to dizziness. He denies any recent illness, denies any recent medication changes, he does take metoprolol but has had the same dose for quite some time. He denies any vomiting or diarrhea. No other complaints, modifying factors or associated symptoms. I have reviewed the following from the nursing documentation.     Past Medical History:   Diagnosis Date    Difficult intubation     on one occasion when weighed 405#    Erectile dysfunction     GERD (gastroesophageal reflux disease)     HNP (herniated nucleus pulposus), cervical     recent diagnosis    Hypertension     history of ; not at this time    Obesity     Pain     Cervical spine; intermittant; r/t HNP     Past Surgical History:   Procedure Laterality Date    CHOLECYSTECTOMY  2010    COLONOSCOPY  2/20/15    Normal    ENDOSCOPY, COLON, DIAGNOSTIC      with dilitation x 3    EYE SURGERY      Right r/t torn retina    GASTRIC BYPASS SURGERY      KNEE ARTHROSCOPY      Right    SHOULDER SURGERY      Left shoulder; bone spurs     Family History Problem Relation Age of Onset    Cancer Mother     Cancer Father     Cancer Sister     Heart Disease Brother     Heart Disease Brother      Social History     Socioeconomic History    Marital status:      Spouse name: Not on file    Number of children: Not on file    Years of education: Not on file    Highest education level: Not on file   Occupational History    Not on file   Tobacco Use    Smoking status: Never Smoker    Smokeless tobacco: Never Used   Substance and Sexual Activity    Alcohol use: No    Drug use: No    Sexual activity: Not on file   Other Topics Concern    Not on file   Social History Narrative    Not on file     Social Determinants of Health     Financial Resource Strain:     Difficulty of Paying Living Expenses: Not on file   Food Insecurity:     Worried About [email or web address] in the Last Year: Not on file    Sheila of Food in the Last Year: Not on file   Transportation Needs:     Lack of Transportation (Medical): Not on file    Lack of Transportation (Non-Medical):  Not on file   Physical Activity:     Days of Exercise per Week: Not on file    Minutes of Exercise per Session: Not on file   Stress:     Feeling of Stress : Not on file   Social Connections:     Frequency of Communication with Friends and Family: Not on file    Frequency of Social Gatherings with Friends and Family: Not on file    Attends Congregational Services: Not on file    Active Member of [address] [place] [address] [employer] or Organizations: Not on file    Attends Club or Organization Meetings: Not on file    Marital Status: Not on file   Intimate Partner Violence:     Fear of Current or Ex-Partner: Not on file    Emotionally Abused: Not on file    Physically Abused: Not on file    Sexually Abused: Not on file   Housing Stability:     Unable to Pay for Housing in the Last Year: Not on file    Number of [place] in the Last Year: Not on file    Unstable Housing in the Last Year: Not on file     No current facility-administered medications for this encounter. Current Outpatient Medications   Medication Sig Dispense Refill    primidone (MYSOLINE) 250 MG tablet Take 300 mg by mouth nightly      meloxicam (MOBIC) 15 MG tablet Take 15 mg by mouth daily      metoprolol succinate (TOPROL XL) 100 MG extended release tablet Take 100 mg by mouth daily      amitriptyline (ELAVIL) 10 MG tablet Take 10 mg by mouth nightly      aspirin 81 MG EC tablet Take 81 mg by mouth daily      buPROPion (WELLBUTRIN SR) 150 MG extended release tablet Take 150 mg by mouth 2 times daily      doxyLAMINE succinate (GNP SLEEP AID) 25 MG tablet Take 25 mg by mouth nightly      OXcarbazepine (TRILEPTAL) 600 MG tablet Take 600 mg by mouth 2 times daily      tamsulosin (FLOMAX) 0.4 MG capsule Take 0.4 mg by mouth daily      vitamin B-12 (CYANOCOBALAMIN) 1000 MCG tablet Take 1,000 mcg by mouth daily      omeprazole (PRILOSEC) 20 MG delayed release capsule TAKE ONE CAPSULE BY MOUTH twice daily 180 capsule 0     Allergies   Allergen Reactions    Tape [Adhesive Tape] Dermatitis       REVIEW OF SYSTEMS  10 systems reviewed, pertinent positives per HPI otherwise noted to be negative. PHYSICAL EXAM  BP (!) 155/86   Pulse (!) 44   Temp 97.4 °F (36.3 °C)   Resp 17   Ht 6' 4\" (1.93 m)   Wt 300 lb (136.1 kg)   SpO2 95%   BMI 36.52 kg/m²    Physical exam:  General appearance: awake and cooperative. He is distressed, eyes closed. Non toxic appearing. Skin: Warm and dry. No rashes or lesions. HENT: Normocephalic. Atraumatic. Mucus membranes are moist.   Neck: supple  Eyes: ABDULKADIR. EOM intact. Heart: bradycardic rate. Regular rhythm. No murmurs. Lungs: Respirations unlabored. CTAB. No wheezes, rales, or rhonchi. Good air exchange  Abdomen: No tenderness. Soft. Non distended. No peritoneal signs. Musculoskeletal: No extremity edema. Compartments soft. No deformity. No tenderness in the extremities.  All extremities neurovascularly intact. Radial, Dp, and PT pulses +2/4 bilaterally  Neurological: Alert and oriented. No focal deficits. No aphasia or dysarthria. Gait not tested  Psychiatric: Normal mood and affect. LABS  I have reviewed all labs for this visit.    Results for orders placed or performed during the hospital encounter of 12/25/21   CBC Auto Differential   Result Value Ref Range    WBC 4.8 4.0 - 11.0 K/uL    RBC 4.48 4.20 - 5.90 M/uL    Hemoglobin 14.4 13.5 - 17.5 g/dL    Hematocrit 41.4 40.5 - 52.5 %    MCV 92.3 80.0 - 100.0 fL    MCH 32.1 26.0 - 34.0 pg    MCHC 34.8 31.0 - 36.0 g/dL    RDW 12.9 12.4 - 15.4 %    Platelets 972 834 - 604 K/uL    MPV 7.3 5.0 - 10.5 fL    Neutrophils % 53.7 %    Lymphocytes % 35.1 %    Monocytes % 6.5 %    Eosinophils % 4.0 %    Basophils % 0.7 %    Neutrophils Absolute 2.6 1.7 - 7.7 K/uL    Lymphocytes Absolute 1.7 1.0 - 5.1 K/uL    Monocytes Absolute 0.3 0.0 - 1.3 K/uL    Eosinophils Absolute 0.2 0.0 - 0.6 K/uL    Basophils Absolute 0.0 0.0 - 0.2 K/uL   Comprehensive Metabolic Panel w/ Reflex to MG   Result Value Ref Range    Sodium 134 (L) 136 - 145 mmol/L    Potassium reflex Magnesium 4.3 3.5 - 5.1 mmol/L    Chloride 101 99 - 110 mmol/L    CO2 24 21 - 32 mmol/L    Anion Gap 9 3 - 16    Glucose 115 (H) 70 - 99 mg/dL    BUN 12 7 - 20 mg/dL    CREATININE 1.1 0.8 - 1.3 mg/dL    GFR Non-African American >60 >60    GFR African American >60 >60    Calcium 8.5 8.3 - 10.6 mg/dL    Total Protein 6.4 6.4 - 8.2 g/dL    Albumin 4.1 3.4 - 5.0 g/dL    Albumin/Globulin Ratio 1.8 1.1 - 2.2    Total Bilirubin 0.4 0.0 - 1.0 mg/dL    Alkaline Phosphatase 122 40 - 129 U/L    ALT 33 10 - 40 U/L    AST 25 15 - 37 U/L   Troponin   Result Value Ref Range    Troponin <0.01 <0.01 ng/mL   TSH with Reflex   Result Value Ref Range    TSH 1.14 0.27 - 4.20 uIU/mL   Brain Natriuretic Peptide   Result Value Ref Range    Pro- (H) 0 - 124 pg/mL   EKG 12 Lead   Result Value Ref Range    Ventricular Rate 43 BPM    Atrial Rate 43 BPM    P-R Interval 170 ms    QRS Duration 92 ms    Q-T Interval 438 ms    QTc Calculation (Bazett) 370 ms    P Axis 26 degrees    R Axis 58 degrees    T Axis 27 degrees    Diagnosis       Marked sinus bradycardiaAbnormal ECGNo previous ECGs available       ECG  The Ekg interpreted by me shows  sinus bradycardia, rate=43   Axis is   Normal  QTc is  within an acceptable range  Intervals and Durations are unremarkable. ST Segments: normal    RADIOLOGY  XR CHEST PORTABLE    Result Date: 12/25/2021  EXAMINATION: ONE XRAY VIEW OF THE CHEST 12/25/2021 7:36 pm COMPARISON: 10/19/2011 HISTORY: ORDERING SYSTEM PROVIDED HISTORY: dizzy, bradycardic TECHNOLOGIST PROVIDED HISTORY: Reason for exam:->dizzy, bradycardic Reason for Exam: Patient was carrying laundry earlier and got weak and dizzy and thought he may pass out FINDINGS: The heart is borderline enlarged but unchanged. The pulmonary vessels are engorged centrally more prominent. There are some hazy bibasilar interstitial opacities which has increased. No consolidation or effusion is seen. Stable mild cardiomegaly and mild central pulmonary congestion Mild bibasilar interstitial edema vs early pneumonitis or atelectasis. ED COURSE/MDM  Patient seen and evaluated. Old records reviewed. Labs and imaging reviewed and results discussed with patient. This is a 70-year-old male presenting with dizziness. He is bradycardic in the 30s in triage, when moved to room he is bradycardic at 43. He is not hypotensive. He is not hypoxic or febrile. He has his eyes closed, appears uncomfortable and states he is dizzy. He has had somewhat bradycardic heart rates in the past, around 60 but never anything as low as the 30s. EKG does show a sinus bradycardia no obvious sign of a block. He is not in atrial fibrillation. His electrolytes are within normal limits, troponin is negative. His EKG is nonischemic. Chest x-ray is negative for acute process.   [facility] I did call and attempted to have him admitted there however they did not have any beds available. Therefore there were beds available at [facility] so I spoke with Dr. Brittany Giron at [facility] who accepts the patient for transfer. He remains at bradycardic but hemodynamically stable with normal blood pressure. Actually on reevaluation, he is sitting up in the room, eating, stating he feels significantly improved. His heart rate is in the high 40s and low 50s. Given his bradycardia earlier and symptoms associated with this, I still feel that transfer is the most prudent decision. Patient is agreeable. During the patient's ED course, the patient was given:  Medications - No data to display     CLINICAL IMPRESSION  1. Symptomatic bradycardia        Blood pressure (!) 155/86, pulse (!) 44, temperature 97.4 °F (36.3 °C), resp. rate 17, height 6' 4\" (1.93 m), weight 300 lb (136.1 kg), SpO2 95 %. Patient was given scripts for the following medications. I counseled patient how to take these medications. New Prescriptions    No medications on file       Follow-up with:  No follow-up provider specified. DISCLAIMER: This chart was created using Dragon dictation software. Efforts were made by me to ensure accuracy, however some errors may be present due to limitations of this technology and occasionally words are not transcribed correctly.        Krupa [place]  12/26/21 9227

## 2021-12-27 VITALS
BODY MASS INDEX: 34.58 KG/M2 | RESPIRATION RATE: 20 BRPM | WEIGHT: 284.01 LBS | TEMPERATURE: 98 F | DIASTOLIC BLOOD PRESSURE: 94 MMHG | HEIGHT: 76 IN | SYSTOLIC BLOOD PRESSURE: 156 MMHG | OXYGEN SATURATION: 96 % | HEART RATE: 85 BPM

## 2021-12-27 LAB
ALBUMIN SERPL-MCNC: 4.2 G/DL (ref 3.4–5)
ANION GAP SERPL CALCULATED.3IONS-SCNC: 10 MMOL/L (ref 3–16)
BASOPHILS ABSOLUTE: 0 K/UL (ref 0–0.2)
BASOPHILS RELATIVE PERCENT: 0.2 %
BUN BLDV-MCNC: 11 MG/DL (ref 7–20)
CALCIUM SERPL-MCNC: 8.3 MG/DL (ref 8.3–10.6)
CHLORIDE BLD-SCNC: 102 MMOL/L (ref 99–110)
CO2: 24 MMOL/L (ref 21–32)
CREAT SERPL-MCNC: 1 MG/DL (ref 0.8–1.3)
EKG ATRIAL RATE: 55 BPM
EKG DIAGNOSIS: NORMAL
EKG P AXIS: 50 DEGREES
EKG P-R INTERVAL: 168 MS
EKG Q-T INTERVAL: 430 MS
EKG QRS DURATION: 92 MS
EKG QTC CALCULATION (BAZETT): 411 MS
EKG R AXIS: 62 DEGREES
EKG T AXIS: 61 DEGREES
EKG VENTRICULAR RATE: 55 BPM
EOSINOPHILS ABSOLUTE: 0.2 K/UL (ref 0–0.6)
EOSINOPHILS RELATIVE PERCENT: 2.9 %
GFR AFRICAN AMERICAN: >60
GFR NON-AFRICAN AMERICAN: >60
GLUCOSE BLD-MCNC: 87 MG/DL (ref 70–99)
HCT VFR BLD CALC: 43.8 % (ref 40.5–52.5)
HEMOGLOBIN: 15 G/DL (ref 13.5–17.5)
LV EF: 58 %
LVEF MODALITY: NORMAL
LYMPHOCYTES ABSOLUTE: 2.1 K/UL (ref 1–5.1)
LYMPHOCYTES RELATIVE PERCENT: 36.4 %
MCH RBC QN AUTO: 32.1 PG (ref 26–34)
MCHC RBC AUTO-ENTMCNC: 34.3 G/DL (ref 31–36)
MCV RBC AUTO: 93.7 FL (ref 80–100)
MONOCYTES ABSOLUTE: 0.4 K/UL (ref 0–1.3)
MONOCYTES RELATIVE PERCENT: 6.7 %
NEUTROPHILS ABSOLUTE: 3.1 K/UL (ref 1.7–7.7)
NEUTROPHILS RELATIVE PERCENT: 53.8 %
PDW BLD-RTO: 13 % (ref 12.4–15.4)
PHOSPHORUS: 3.7 MG/DL (ref 2.5–4.9)
PLATELET # BLD: 159 K/UL (ref 135–450)
PMV BLD AUTO: 8.2 FL (ref 5–10.5)
POTASSIUM SERPL-SCNC: 4 MMOL/L (ref 3.5–5.1)
RBC # BLD: 4.68 M/UL (ref 4.2–5.9)
SODIUM BLD-SCNC: 136 MMOL/L (ref 136–145)
WBC # BLD: 5.7 K/UL (ref 4–11)

## 2021-12-27 PROCEDURE — 36415 COLL VENOUS BLD VENIPUNCTURE: CPT

## 2021-12-27 PROCEDURE — 6370000000 HC RX 637 (ALT 250 FOR IP): Performed by: STUDENT IN AN ORGANIZED HEALTH CARE EDUCATION/TRAINING PROGRAM

## 2021-12-27 PROCEDURE — 80069 RENAL FUNCTION PANEL: CPT

## 2021-12-27 PROCEDURE — 85025 COMPLETE CBC W/AUTO DIFF WBC: CPT

## 2021-12-27 PROCEDURE — 99223 1ST HOSP IP/OBS HIGH 75: CPT | Performed by: INTERNAL MEDICINE

## 2021-12-27 PROCEDURE — 93005 ELECTROCARDIOGRAM TRACING: CPT | Performed by: INTERNAL MEDICINE

## 2021-12-27 PROCEDURE — 6360000002 HC RX W HCPCS: Performed by: STUDENT IN AN ORGANIZED HEALTH CARE EDUCATION/TRAINING PROGRAM

## 2021-12-27 PROCEDURE — 93306 TTE W/DOPPLER COMPLETE: CPT

## 2021-12-27 PROCEDURE — 2580000003 HC RX 258: Performed by: STUDENT IN AN ORGANIZED HEALTH CARE EDUCATION/TRAINING PROGRAM

## 2021-12-27 PROCEDURE — 93010 ELECTROCARDIOGRAM REPORT: CPT | Performed by: INTERNAL MEDICINE

## 2021-12-27 RX ORDER — M-VIT,TX,IRON,MINS/CALC/FOLIC 27MG-0.4MG
1 TABLET ORAL DAILY
COMMUNITY

## 2021-12-27 RX ORDER — AMLODIPINE BESYLATE 5 MG/1
5 TABLET ORAL DAILY
Qty: 30 TABLET | Refills: 1 | Status: SHIPPED | OUTPATIENT
Start: 2021-12-27

## 2021-12-27 RX ORDER — METOPROLOL SUCCINATE 25 MG/1
12.5 TABLET, EXTENDED RELEASE ORAL DAILY
Qty: 30 TABLET | Refills: 1 | Status: SHIPPED | OUTPATIENT
Start: 2021-12-27

## 2021-12-27 RX ORDER — METOPROLOL SUCCINATE 50 MG/1
50 TABLET, EXTENDED RELEASE ORAL DAILY
Status: ON HOLD | COMMUNITY
End: 2021-12-27 | Stop reason: HOSPADM

## 2021-12-27 RX ORDER — FERROUS SULFATE 325(65) MG
325 TABLET ORAL
COMMUNITY

## 2021-12-27 RX ADMIN — SODIUM CHLORIDE, PRESERVATIVE FREE 10 ML: 5 INJECTION INTRAVENOUS at 10:25

## 2021-12-27 RX ADMIN — ASPIRIN 81 MG: 81 TABLET, COATED ORAL at 10:26

## 2021-12-27 RX ADMIN — OXCARBAZEPINE 600 MG: 300 TABLET, FILM COATED ORAL at 10:26

## 2021-12-27 RX ADMIN — BUPROPION HYDROCHLORIDE 150 MG: 150 TABLET, EXTENDED RELEASE ORAL at 10:26

## 2021-12-27 RX ADMIN — HEPARIN SODIUM 5000 UNITS: 5000 INJECTION INTRAVENOUS; SUBCUTANEOUS at 06:27

## 2021-12-27 RX ADMIN — CYANOCOBALAMIN TAB 1000 MCG 1000 MCG: 1000 TAB at 10:26

## 2021-12-27 RX ADMIN — HEPARIN SODIUM 5000 UNITS: 5000 INJECTION INTRAVENOUS; SUBCUTANEOUS at 18:36

## 2021-12-27 ASSESSMENT — ENCOUNTER SYMPTOMS
SHORTNESS OF BREATH: 0
CONSTIPATION: 0
BACK PAIN: 0
VOMITING: 0
NAUSEA: 0
COUGH: 0
DIARRHEA: 0
ABDOMINAL PAIN: 0

## 2021-12-27 ASSESSMENT — PAIN SCALES - GENERAL
PAINLEVEL_OUTOF10: 0

## 2021-12-27 NOTE — PROGRESS NOTES
Progress Note    Admit Date: 12/26/2021  Day: 2  Diet: ADULT DIET; Regular    CC: Dizziness    Interval history: No acute events noted overnight. Pt admitted yesterday afternoon for dizziness, and found to be bradycardic during the event. He denies chest pain, SOB, numbness, visual changes, changes in temperature and nausea during the event. Pt states that he had a similar episode during September following the death of his son. He went to the [place] and was tx for Atrial tachycardia, wore an event monitor for 2 weeks and is going to f/u with a cardiologist this coming January. Prior to the most recent event, the pt notes that he received the gravestone in the mail this past week, and noted stress and sadness within his family this past holiday season, as they grieve the loss of his son. Pt recently had medication changes following his admission at the [place], and has had new medication changes from his OP Psychiatrist he sees as well.     HR 68 to 42   to 146    Medications:     Scheduled Meds:   sodium chloride flush  5-40 mL IntraVENous 2 times per day    [Held by provider] amitriptyline  20 mg Oral Nightly    aspirin  81 mg Oral Daily    buPROPion  150 mg Oral BID    OXcarbazepine  600 mg Oral BID    primidone  300 mg Oral Nightly    vitamin B-12  1,000 mcg Oral Daily    heparin (porcine)  5,000 Units SubCUTAneous 3 times per day     Continuous Infusions:   sodium chloride       PRN Meds:sodium chloride flush, sodium chloride, ondansetron **OR** ondansetron, polyethylene glycol, acetaminophen **OR** acetaminophen, pantoprazole, perflutren lipid microspheres    Objective:   Vitals:   T-max:  Patient Vitals for the past 8 hrs:   BP Temp Temp src Pulse Resp SpO2   12/27/21 1023 (!) 146/95 97.8 °F (36.6 °C) Oral 56 18 95 %   12/27/21 0434 (!) 146/95 98.3 °F (36.8 °C) Oral 58 18 95 %       Intake/Output Summary (Last 24 hours) at 12/27/2021 1135  Last data filed at 12/27/2021 0239  Gross per 24 hour Intake 720 ml   Output 1200 ml   Net -480 ml       Review of Systems   Constitutional: Negative for fever. Eyes: Negative for visual disturbance. Respiratory: Negative for cough and shortness of breath. Cardiovascular: Negative for chest pain and palpitations. Gastrointestinal: Negative for abdominal pain, constipation, diarrhea, nausea and vomiting. Musculoskeletal: Negative for arthralgias, back pain and myalgias. Neurological: Negative for weakness, numbness and headaches. Physical Exam  Constitutional:       General: He is not in acute distress. Appearance: He is obese. He is not ill-appearing, toxic-appearing or diaphoretic. HENT:      Head: Normocephalic and atraumatic. Eyes:      Extraocular Movements: Extraocular movements intact. Cardiovascular:      Rate and Rhythm: Normal rate and regular rhythm. Heart sounds: No murmur heard. No friction rub. No gallop. Pulmonary:      Effort: Pulmonary effort is normal.      Breath sounds: No wheezing, rhonchi or rales. Abdominal:      General: Bowel sounds are normal. There is no distension. Palpations: Abdomen is soft. Tenderness: There is no abdominal tenderness. There is no guarding or rebound. Musculoskeletal:      Right lower leg: No edema. Left lower leg: No edema. Neurological:      Mental Status: He is alert and oriented to person, place, and time.          LABS:    CBC:   Recent Labs     12/25/21 1949 12/26/21 1611 12/27/21  0514   WBC 4.8 4.9 5.7   HGB 14.4 15.2 15.0   HCT 41.4 44.2 43.8    148 159   MCV 92.3 94.1 93.7     Renal:    Recent Labs     12/25/21 1949 12/26/21  1611 12/27/21  0514   * 137 136   K 4.3 4.2 4.0    103 102   CO2 24 24 24   BUN 12 10 11   CREATININE 1.1 0.9 1.0   GLUCOSE 115* 113* 87   CALCIUM 8.5 8.4 8.3   MG  --  2.00  --    PHOS  --  3.2 3.7   ANIONGAP 9 10 10     Hepatic:   Recent Labs     12/25/21 1949 12/26/21  1611 12/27/21  0514   AST 25  --   -- ALT 33  --   --    BILITOT 0.4  --   --    PROT 6.4  --   --    LABALBU 4.1 4.3 4.2   ALKPHOS 122  --   --      Troponin:   Recent Labs     12/25/21  1949   TROPONINI <0.01     BNP: No results for input(s): BNP in the last 72 hours. Lipids: No results for input(s): CHOL, HDL in the last 72 hours. Invalid input(s): LDLCALCU, TRIGLYCERIDE  ABGs:  No results for input(s): PHART, HTH3LTZ, PO2ART, CWI0ZEG, BEART, THGBART, Q3WXZUMX, JDN3TUQ in the last 72 hours. INR:   Recent Labs     12/26/21  1612   INR 1.07     Lactate: No results for input(s): LACTATE in the last 72 hours. Cultures:  -----------------------------------------------------------------  RAD:   No orders to display       Assessment/Plan:     Symptomatic sinus bradycardia  Pt hospitalized for similar episode of lightheadedness at [place]. Pt found to have atrial tachycardia at [place] in September 2021. Pt wore event monitor for 2 weeks following discharge.     -Consider d/c pending cardiology's evaluation  -Multiple medication changes since September, considering Iatrogenic cause to event  -Hold metoprolol, consider stopping upon d/c  -Telemetry  -Cardiology consulted       MDD, PTSD  Pt has a hx of MDD and PTSD. He sees a psychiatrist Dr. Carlota Martinez in the outpatient setting and notes recent medication changes.    -Consider Trazodone qHS  -Hold Amitriptyline 20 mg qHS  -Wellbutrin 150 mg BID    Code Status: Full code  FEN: ADULT DIET;  Regular  PPX: Subq Heparin  DISPO: IP    Rebekah Redmond DO, PGY-1  12/27/21  11:35 AM    This patient has been staffed and discussed with Renita Bernabe MD.

## 2021-12-27 NOTE — CARE COORDINATION
Case Management Assessment            Discharge Note                    Date / Time of Note: 12/27/2021 3:23 PM                  Discharge Note Completed by: Kelle Weeks, ROSA, WANDAW    Patient Name: Sunil Berg   YOB: 1955  Diagnosis: Symptomatic bradycardia [R00.1]   Date / Time: 12/26/2021 11:41 AM    Current PCP: Orestes Hutton MD  Clinic patient: No    Hospitalization in the last 30 days: Yes    Advance Directives:  Code Status: Full Code  [address] DNR form completed and on chart: No    Financial:  Payor: Angelito Lanza 150 / Plan: [address] [facility] 305 / Product Type: *No Product type* /      Pharmacy:    [address] [place] [address]  [address]  Phone: [phone] Fax: [phone]      Assistance purchasing medications?: Potential Assistance Purchasing Medications: No  Assistance provided by Case Management: None at this time    Does patient want to participate in local refill/ meds to beds program?:      Meds To Beds General Rules:  1. Can ONLY be done Monday- Friday between 8:30am-5pm  2. Prescription(s) must be in pharmacy by 3pm to be filled same day  3. Copy of patient's insurance/ prescription drug card and patient face sheet must be sent along with the prescription(s)  4. Cost of Rx cannot be added to hospital bill. If financial assistance is needed, please contact unit  or ;  or  CANNOT provide pharmacy voucher for patients co-pays  5.  Patients can then  the prescription on their way out of the hospital at discharge, or pharmacy can deliver to the bedside if staff is available. (payment due at time of pick-up or delivery - cash, check, or card accepted)     Able to afford home medications/ co-pay costs: Yes    ADLS:  Current PT AM-PAC Score:   /24  Current OT AM-PAC Score:   /24      DISCHARGE Disposition: Home- No Services Needed    LOC at discharge: Not Applicable  LYNNETTE Completed: Yes    Notification completed in HENS/PAS?:  Not Applicable    IMM Completed:   Not Indicated    Transportation:  Transportation PLAN for discharge: family   Mode of Transport: Private [place] ordered at discharge: No    Durable Medical Equipment:  DME Provider: none  Equipment obtained during hospitalization: none    Home Oxygen and Respiratory Equipment:  Oxygen needed at discharge?: No    Dialysis:  Dialysis patient: No    Referrals made at [facility] for outpatient continued care:  Not Applicable    Additional CM Notes:   SW met w/Pt and Pt's wife at bedside. Pt is from home w/wife. Pt doesn't have any current services. Pt denies HHC. Pt's wife will transport home. Pt has no other needs at this time. The Plan for Transition of Care is related to the following treatment goals of Symptomatic bradycardia [R00.1]    The Patient and/or patient representative Kailey Peralta and his family were provided with a choice of provider and agrees with the discharge plan Yes    Freedom of choice list was provided with basic dialogue that supports the patient's individualized plan of care/goals and shares the quality data associated with the providers.  Yes    Care Transitions patient: No    ROSA Ariza, [facility]  Case Management Department  Ph: [phone]  Fax: [phone]

## 2021-12-27 NOTE — CONSULTS
Clinical Pharmacy Progress Note  Medication History     Admit Date: 12/26/2021    List of of current medications patient is taking is complete. Home Medication list in EPIC updated to reflect changes noted below. Source of information: List provided by patient, patient conversation, Chart Review    Patient's home pharmacy: [address] [place] [address] [address] made to medication list:   Medications added:    Multivitamin   Iron Supplement  Medication doses adjusted:    Amitriptyline 20mg QHS  Other notes:    Medication list now up to date based on list provided by patient and confirmed via chart review of [facility].  PS sent to Dr. Kirby Cassidy. Please call with any questions.   Delores Reid, PharmD  PGY-1 Pharmacy Resident  [id number]  12/27/2021 9:06 AM

## 2021-12-27 NOTE — DISCHARGE SUMMARY
INTERNAL MEDICINE DEPARTMENT AT [address] [place] [address]  DISCHARGE SUMMARY    Patient ID: Monet Bernard                                             Discharge Date: 12/27/2021   Patient's PCP: Mavis Mendez MD                                          Discharge Physician: Fatou Rodriguez DO PGY-1  Admit Date: 12/26/2021   Admitting Physician: Donato Mccormack MD    PROBLEMS DURING HOSPITALIZATION:  Present on Admission:   Symptomatic bradycardia      DISCHARGE DIAGNOSES:  1. 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 He said that his wife took his pulse at that time when he was in the 40s.    Patient follows at the [place] after his son's death in September he was admitted to the hospital with similar symptoms and was treated for A. tach and told to follow-up with her cardiologist in January. Since September he has not been symptomatic until yesterday.     ROS negative for chest pain, shortness of breath, abdominal pain, N/V,.  ROS positive for palpitations. The following issues were addressed during hospitalization:    Symptomatic sinus bradycardia  Pt hospitalized for similar episode of lightheadedness at [place]. Pt found to have atrial tachycardia at [place] in September 2021. Pt wore event monitor for 2 weeks following discharge and is to follow up with a EP Cardiologist this coming January 10th. Upon admission, pt had a normal heart rhythm and a rate in the 40s. Cardiology was consulted, and his home Metoprolol XL was held. The pt's HR improved into the 60s to 80s after and the pt was to be discharge as he was no longer experiencing bradycardia. Pt is to reduce his Metoprolol XL dosage from 50 mg to 12.5 mg daily. Pt was also hypertensive during his stay, with a SBP ranging between 164 to 146.  The pt is to start Amlodipine 5 mg daily, and is to follow up with his PCP for further management of his HTN.     Physical Exam:  BP (!) 156/94   Pulse 85   Temp 98 °F (36.7 °C) (Oral)   Resp 20   Ht 6' 4\" (1.93 m)   Wt 284 lb 0.2 oz (128.8 kg)   SpO2 96%   BMI 34.57 kg/m²       Physical Exam  Constitutional:       General: He is not in acute distress. Appearance: He is obese. He is not ill-appearing, toxic-appearing or diaphoretic. HENT:      Head: Normocephalic and atraumatic. Eyes:      Extraocular Movements: Extraocular movements intact. Cardiovascular:      Rate and Rhythm: Normal rate and regular rhythm. Heart sounds: No murmur heard. No friction rub. No gallop. Pulmonary:      Breath sounds: No wheezing, rhonchi or rales. Abdominal:      General: Bowel sounds are normal. There is no distension. Palpations: Abdomen is soft. Tenderness: There is no abdominal tenderness. There is no guarding or rebound. Musculoskeletal:      Right lower leg: No edema. Left lower leg: No edema. Neurological:      Mental Status: He is alert and oriented to person, place, and time. Consults: cardiology  Significant Diagnostic Studies:  Echocardiogram  Treatments: Held home metoprolol XL  Disposition: home  Discharged Condition: Stable  Follow Up: Primary Care Physician in one week, Follow up with your outpatient cardiologist on January 10th, and follow up with outpatient psychiatrist within one month. DISCHARGE MEDICATION:     Medication List      START taking these medications    amLODIPine 5 MG tablet  Commonly known as: NORVASC  Take 1 tablet by mouth daily        CHANGE how you take these medications    metoprolol succinate 25 MG extended release tablet  Commonly known as: Toprol XL  Take 0.5 tablets by mouth daily  What changed:   · medication strength  · how much to take  · Another medication with the same name was removed. Continue taking this medication, and follow the directions you see here.         CONTINUE taking these medications amitriptyline 10 MG tablet  Commonly known as: ELAVIL     aspirin 81 MG EC tablet     buPROPion 150 MG extended release tablet  Commonly known as: WELLBUTRIN SR     doxyLAMINE succinate 25 MG tablet  Commonly known as: GNP SLEEP AID     ferrous sulfate 325 (65 Fe) MG tablet  Commonly known as: IRON 325     meloxicam 15 MG tablet  Commonly known as: MOBIC     omeprazole 20 MG delayed release capsule  Commonly known as: PRILOSEC  TAKE ONE CAPSULE BY MOUTH twice daily     OXcarbazepine 600 MG tablet  Commonly known as: TRILEPTAL     primidone 250 MG tablet  Commonly known as: MYSOLINE     tamsulosin 0.4 MG capsule  Commonly known as: FLOMAX     therapeutic multivitamin-minerals tablet     vitamin B-12 1000 MCG tablet  Commonly known as: CYANOCOBALAMIN           Where to Get Your Medications      You can get these medications from any pharmacy    Bring a paper prescription for each of these medications  · amLODIPine 5 MG tablet  · metoprolol succinate 25 MG extended release tablet       Activity: activity as tolerated  Diet: regular diet  Wound Care: none needed    Time Spent on discharge is more than 30 minutes    Signed:  Dinora Ennis DO,  PGY-1  Internal Medicine Resident  12/27/2021  Patient seen and examined, labs and imaging reviewed, agree with assessment and plan as outlined above. patients condition continues to improve doing well, asked patient to monitor side effects of medications which i've discussed at length, recurrence of symptoms or new symptoms including but not limited to chest pain, shortness of breath, nausea, vomiting, fevers or chills and seek immediate medical attention or call 911. Greater than 30 minutes spent on case on day of discharge. Patient has cardiology appt in two weeks, discussed sleep study with patient, decrease dose of metoprolol and if still this occurs discontinue.   D/w cardiology     Tavo Estevez MD FACP

## 2021-12-27 NOTE — CONSULTS
[address]         Reason for Consultation/Chief Complaint: \"I have been having fatigue and dizziness\"       History of Present Illness:  Candelario Guadalupe is a 61 y.o. patient who presented to the hospital with complaints of fatigue and dizziness while taking toprol xl 100 mg daily. Pt has HTN and no history of CAD. Has had arrhythmia according to [facility] records. Past Medical History:   has a past medical history of Difficult intubation, Erectile dysfunction, GERD (gastroesophageal reflux disease), HNP (herniated nucleus pulposus), cervical, Hypertension, Obesity, and Pain. Surgical History:   has a past surgical history that includes Gastric bypass surgery; Cholecystectomy (2010); Endoscopy, colon, diagnostic; Knee arthroscopy; shoulder surgery; eye surgery; and Colonoscopy (2/20/15). Social History:   reports that he has never smoked. He has never used smokeless tobacco. He reports that he does not drink alcohol and does not use drugs. Family History:  No evidence for sudden cardiac death or premature CAD    Home Medications:  Were reviewed and are listed in nursing record. and/or listed below  Prior to Admission medications    Medication Sig Start Date End Date Taking?  Authorizing Provider   Multiple Vitamins-Minerals (THERAPEUTIC MULTIVITAMIN-MINERALS) tablet Take 1 tablet by mouth daily   Yes Historical Provider, MD   ferrous sulfate (IRON 325) 325 (65 Fe) MG tablet Take 325 mg by mouth daily (with breakfast)   Yes Historical Provider, MD   primidone (MYSOLINE) 250 MG tablet Take 300 mg by mouth nightly   Yes Historical Provider, MD   meloxicam (MOBIC) 15 MG tablet Take 15 mg by mouth daily   Yes Historical Provider, MD   amitriptyline (ELAVIL) 10 MG tablet Take 20 mg by mouth nightly    Yes Historical Provider, MD   aspirin 81 MG EC tablet Take 81 mg by mouth daily   Yes Historical Provider, MD   buPROPion (WELLBUTRIN SR) 150 MG extended release tablet Take 150 mg by mouth 2 times daily   Yes Historical Provider, MD   doxyLAMINE succinate (GNP SLEEP AID) 25 MG tablet Take by mouth 25mg in the daytime and 50mg at night time   Yes Historical Provider, MD   OXcarbazepine (TRILEPTAL) 600 MG tablet Take 600 mg by mouth 2 times daily   Yes Historical Provider, MD   tamsulosin (FLOMAX) 0.4 MG capsule Take 0.4 mg by mouth daily   Yes Historical Provider, MD   vitamin B-12 (CYANOCOBALAMIN) 1000 MCG tablet Take 1,000 mcg by mouth daily   Yes Historical Provider, MD   omeprazole (PRILOSEC) 20 MG delayed release capsule TAKE ONE CAPSULE BY MOUTH twice daily 2/26/18  Yes Ana Maria Louis MD        [facility] Medications:   sodium chloride flush  5-40 mL IntraVENous 2 times per day    [Held by provider] amitriptyline  20 mg Oral Nightly    aspirin  81 mg Oral Daily    buPROPion  150 mg Oral BID    OXcarbazepine  600 mg Oral BID    primidone  300 mg Oral Nightly    vitamin B-12  1,000 mcg Oral Daily    heparin (porcine)  5,000 Units SubCUTAneous 3 times per day     sodium chloride flush, sodium chloride, ondansetron **OR** ondansetron, polyethylene glycol, acetaminophen **OR** acetaminophen, pantoprazole, perflutren lipid microspheres   sodium chloride         Allergies:  Tape [adhesive tape]     Review of Systems:     A 14 ROS obtained and negative except as mentioned in HPI. · Constitutional: there has been no unanticipated weight loss. · Eyes: No visual changes or diplopia. No scleral icterus. · ENT: No Headaches, hearing loss or vertigo. No mouth sores or sore throat. · Cardiovascular: No loss of consciousness. No hemoptysis, pleuritic pain, or phlebitis. · Respiratory: No cough or wheezing, no sputum production. No hematemesis. · Gastrointestinal: No abdominal pain, appetite loss, blood in stools. No change in bowel or bladder habits. · Genitourinary: No dysuria, or hematuria.   · Musculoskeletal:  No gait disturbance, weakness or joint complaints. · Integumentary: No rash or pruritis. · Neurological: No headache, diplopia,numbness or tingling. No change in gait, balance, coordination, mood, affect, memory, mentation, behavior. · Psychiatric: No anxiety,  · Endocrine: No malaise,  · Hematologic/Lymphatic: No abnormal bruising  · Allergic/Immunologic: No nasal congestion      Physical Examination:    Vitals:    12/27/21 1341   BP: (!) 156/94   Pulse: 85   Resp: 20   Temp: 98 °F (36.7 °C)   SpO2: 96%    Weight: 284 lb 0.2 oz (128.8 kg)         General Appearance:  Alert, cooperative, no distress, appears stated age   Head:  Normocephalic, without obvious abnormality, atraumatic   Eyes:  PERRL   Nose: Nares normal,   Neck: Supple, JVP normal    Lungs:   Clear to auscultation bilaterally, respirations unlabored   Chest Wall:  No tenderness or deformity   Heart:  Regular rate and rhythm, normal S1, S2 normal, no murmur  No rub. No S3 gallop   Abdomen:   Soft, non-tender, +bowel sounds   Extremities: no cyanosis, no clubbing , No edema   Pulses: Symmetric extremities   Skin: no gross lesions or rashes   Pysch: Normal mood and affect   Neurologic: No gross deficits.   CN II - XII grossly intact        Labs  CBC:   Lab Results   Component Value Date    WBC 5.7 12/27/2021    RBC 4.68 12/27/2021    HGB 15.0 12/27/2021    HCT 43.8 12/27/2021    MCV 93.7 12/27/2021    RDW 13.0 12/27/2021     12/27/2021     CMP:    Lab Results   Component Value Date     12/27/2021    K 4.0 12/27/2021    K 4.3 12/25/2021     12/27/2021    CO2 24 12/27/2021    BUN 11 12/27/2021    CREATININE 1.0 12/27/2021    GFRAA >60 12/27/2021    GFRAA >60 10/19/2011    AGRATIO 1.8 12/25/2021    LABGLOM >60 12/27/2021    LABGLOM 72 02/27/2015    GLUCOSE 87 12/27/2021    GLUCOSE 87 05/02/2012    PROT 6.4 12/25/2021    PROT 6.9 05/02/2012    CALCIUM 8.3 12/27/2021    BILITOT 0.4 12/25/2021    ALKPHOS 122 12/25/2021    AST 25 12/25/2021    ALT 33 12/25/2021     PT/INR:  No results found for: PTINR  Recent Labs     12/25/21 1949   TROPONINI <0.01       EKG: SB with no acute changes. Assessment  Patient Active Problem List   Diagnosis    Erectile dysfunction    Obesity    Insomnia    GERD (gastroesophageal reflux disease)    Iron deficiency anemia    Nausea    Primary osteoarthritis involving multiple joints    Symptomatic bradycardia        Impression:  Symptomatic bradycardia from high dose beta blockade. Recommendations:    I had the opportunity to review the clinical symptoms and presentation of Luis Goodpasture. I recommend that the patient undergo further cardiac treatment with 12.5 mg toprol xl daily in am and add amlodipine 5 mg daily for HTN control. Pt can be discharged from the cardiacstandpoint. Tobacco use was discussed with the patient and educated on the negative effects. I have asked the patient to not utilize these agents. Thank you for allowing to us to participate in the care or Luis Goodpasture. All questions and concerns were addressed to the patient/family. Alternatives to my treatment were discussed. The note was completed using EMR. Every effort was made to ensure accuracy; however, inadvertent computerized transcription errors may be present.        Caryle Median, MD [facility]

## 2021-12-27 NOTE — CONSULTS
Cardiology Consult Note    Admit Date: 2021  Hospital Day: 3    CC: Dizziness    History of Presenting Illness:  60 yo male with PMH of gastric bypass (), GERD, obesity, and HTN presented to the ED with severe dizziness, lightheaded, and weakness while doing laundry. He felt like he was going to pass out. His wife had him lay down and took his pulse which was in the 30s, prompting her to bring him to the ED. Patient mentioned this happened before and he follows at the [place] where he was treated for atrial tachycardia and wore an event monitor for 2 weeks. Patient was to follow up with a cardiologist this coming January. He was taking metoprolol at home for his HTN. Patient attributed this to stress and grieving of his son who recently . When seen at bedside today, patient reports he is doing much better. He denies dizziness, lightheadedness, or weakness. He was asking if the fall precautions an be removed. Telemetry: Sinus rhythm with HR of 62.     Past Medical History:    Past Medical History             Diagnosis Date    Difficult intubation       on one occasion when weighed 405#    Erectile dysfunction      GERD (gastroesophageal reflux disease)      HNP (herniated nucleus pulposus), cervical       recent diagnosis    Hypertension       history of ; not at this time    Obesity      Pain       Cervical spine; intermittant; r/t HNP            Past Surgical History:    Past Surgical History             Procedure Laterality Date    CHOLECYSTECTOMY       COLONOSCOPY   2/20/15     Normal    ENDOSCOPY, COLON, DIAGNOSTIC         with dilitation x 3    EYE SURGERY         Right r/t torn retina    GASTRIC BYPASS SURGERY        KNEE ARTHROSCOPY         Right    SHOULDER SURGERY         Left shoulder; bone spurs            Medications Prior to Admission:      Prescriptions Prior to Admission   Medications Prior to Admission: primidone (MYSOLINE) 250 MG tablet, Take 300 mg by mouth nightly  meloxicam (MOBIC) 15 MG tablet, Take 15 mg by mouth daily  amitriptyline (ELAVIL) 10 MG tablet, Take 10 mg by mouth nightly  aspirin 81 MG EC tablet, Take 81 mg by mouth daily  buPROPion (WELLBUTRIN SR) 150 MG extended release tablet, Take 150 mg by mouth 2 times daily  doxyLAMINE succinate (GNP SLEEP AID) 25 MG tablet, Take 25 mg by mouth 2 times daily   OXcarbazepine (TRILEPTAL) 600 MG tablet, Take 600 mg by mouth 2 times daily  tamsulosin (FLOMAX) 0.4 MG capsule, Take 0.4 mg by mouth daily  vitamin B-12 (CYANOCOBALAMIN) 1000 MCG tablet, Take 1,000 mcg by mouth daily  omeprazole (PRILOSEC) 20 MG delayed release capsule, TAKE ONE CAPSULE BY MOUTH twice daily  [DISCONTINUED] metoprolol succinate (TOPROL XL) 100 MG extended release tablet, Take 100 mg by mouth daily        Allergies:  Tape Arn Screen tape]     Social History:   TOBACCO:   reports that he has never smoked. He has never used smokeless tobacco.  ETOH:   reports no history of alcohol use.   Patient currently lives with family     Family History:   Family History         Medications:     Scheduled Meds:   sodium chloride flush  5-40 mL IntraVENous 2 times per day    [Held by provider] amitriptyline  20 mg Oral Nightly    aspirin  81 mg Oral Daily    buPROPion  150 mg Oral BID    OXcarbazepine  600 mg Oral BID    primidone  300 mg Oral Nightly    vitamin B-12  1,000 mcg Oral Daily    heparin (porcine)  5,000 Units SubCUTAneous 3 times per day     Continuous Infusions:   sodium chloride       PRN Meds:sodium chloride flush, sodium chloride, ondansetron **OR** ondansetron, polyethylene glycol, acetaminophen **OR** acetaminophen, pantoprazole, perflutren lipid microspheres    Objective:   Vitals:   T-max:  Patient Vitals for the past 8 hrs:   BP Temp Temp src Pulse Resp SpO2   12/27/21 1023 (!) 146/95 97.8 °F (36.6 °C) Oral 56 18 95 %   12/27/21 0434 (!) 146/95 98.3 °F (36.8 °C) Oral 58 18 95 %       Intake/Output Summary (Last 24 hours) at 12/27/2021 [address] filed at 12/27/2021 0239  Gross per 24 hour   Intake 720 ml   Output 1200 ml   Net -480 ml       Review of Systems   All other systems reviewed and are negative. Physical Exam  Constitutional:       Appearance: Normal appearance. HENT:      Head: Normocephalic and atraumatic. Right Ear: External ear normal.      Left Ear: External ear normal.      Nose: Nose normal.      Mouth/Throat:      Mouth: Mucous membranes are moist.      Pharynx: Oropharynx is clear. Eyes:      Pupils: Pupils are equal, round, and reactive to light. Cardiovascular:      Rate and Rhythm: Normal rate and regular rhythm. Pulses: Normal pulses. Heart sounds: Normal heart sounds. Pulmonary:      Effort: Pulmonary effort is normal.      Breath sounds: Normal breath sounds. Abdominal:      General: Abdomen is flat. Bowel sounds are normal.      Palpations: Abdomen is soft. Musculoskeletal:         General: Normal range of motion. Cervical back: Normal range of motion. Skin:     General: Skin is warm and dry. Capillary Refill: Capillary refill takes less than 2 seconds. Neurological:      General: No focal deficit present. Mental Status: He is alert and oriented to person, place, and time.          LABS:    CBC:   Recent Labs     12/25/21 1949 12/26/21 1611 12/27/21  0514   WBC 4.8 4.9 5.7   HGB 14.4 15.2 15.0   HCT 41.4 44.2 43.8    148 159   MCV 92.3 94.1 93.7     Renal:    Recent Labs     12/25/21 1949 12/26/21  1611 12/27/21  0514   * 137 136   K 4.3 4.2 4.0    103 102   CO2 24 24 24   BUN 12 10 11   CREATININE 1.1 0.9 1.0   GLUCOSE 115* 113* 87   CALCIUM 8.5 8.4 8.3   MG  --  2.00  --    PHOS  --  3.2 3.7   ANIONGAP 9 10 10     Hepatic:   Recent Labs     12/25/21 1949 12/26/21  1611 12/27/21  0514   AST 25  --   --    ALT 33  --   --    BILITOT 0.4  --   --    PROT 6.4  --   --    LABALBU 4.1 4.3 4.2   ALKPHOS 122  --   --      Troponin:   Recent Labs 12/25/21 1949   TROPONINI <0.01     BNP: No results for input(s): BNP in the last 72 hours. Lipids: No results for input(s): CHOL, HDL in the last 72 hours. Invalid input(s): LDLCALCU, TRIGLYCERIDE  ABGs:  No results for input(s): PHART, CRB0RLE, PO2ART, TOK4QGX, BEART, THGBART, [id number], QXV6OZG in the last 72 hours. INR:   Recent Labs     12/26/21  1612   INR 1.07     Lactate: No results for input(s): LACTATE in the last 72 hours. Cultures:  -----------------------------------------------------------------  RAD:   No orders to display       Assessment/Plan:     60 yo male with PMH of gastric bypass (2009), GERD, obesity, and HTN presented with severe dizziness, lightheaded, and weakness. Symptomatic sinus bradycardia  Patient has history of similar episodes. HR has been improving since holding metoprolol. Telemetry  - hold his blood pressure medication Metoprolol  - orthostatic blood pressure and pulse  - will consider another medication for his blood pressure  - can remove fall precaution after nurse witness him ambulate safely to the washroom    Code Status: Full code  FEN: ADULT DIET; Regular  PPX: Subq Heparin  DISPO: CATHY Mitchell, MS-4. I acted as a scribe for Dr. Cris Zambrano MD.  12/27/21  11:59 AM    This patient has been discussed with Dr. Cris Zambrano MD.      Staff:  agree with above. Will need much smaller lopressor dose. D/w hospitalist service. Feels better with discontinuation of high dose lopressor.

## 2022-02-21 RX ORDER — METOPROLOL SUCCINATE 25 MG/1
12.5 TABLET, EXTENDED RELEASE ORAL DAILY
Qty: 30 TABLET | Refills: 1 | OUTPATIENT
Start: 2022-02-21

## 2022-12-01 NOTE — PROGRESS NOTES
Up-dated pt with plan for today and tomorrow, spoke with Dr. Iban Thornton (cardio0 aware of HR, plan cardio will see in am and evel. Any further need for PPM, to hold any meds that would lower his heart rate. yes